# Patient Record
Sex: MALE | Race: WHITE | NOT HISPANIC OR LATINO | Employment: FULL TIME | URBAN - METROPOLITAN AREA
[De-identification: names, ages, dates, MRNs, and addresses within clinical notes are randomized per-mention and may not be internally consistent; named-entity substitution may affect disease eponyms.]

---

## 2020-02-18 DIAGNOSIS — J20.9 ACUTE BRONCHITIS, UNSPECIFIED ORGANISM: Primary | ICD-10-CM

## 2020-02-18 RX ORDER — METHYLPREDNISOLONE 4 MG/1
TABLET ORAL
Qty: 21 TABLET | Refills: 0 | Status: SHIPPED | OUTPATIENT
Start: 2020-02-18

## 2020-02-18 RX ORDER — PROMETHAZINE HYDROCHLORIDE AND CODEINE PHOSPHATE 6.25; 1 MG/5ML; MG/5ML
5 SYRUP ORAL EVERY 6 HOURS PRN
Qty: 240 ML | Refills: 0 | Status: SHIPPED | OUTPATIENT
Start: 2020-02-18

## 2022-08-17 RX ORDER — SULFAMETHOXAZOLE AND TRIMETHOPRIM 800; 160 MG/1; MG/1
1 TABLET ORAL 2 TIMES DAILY
Qty: 20 TABLET | Refills: 0 | Status: SHIPPED | OUTPATIENT
Start: 2022-08-17

## 2022-11-24 RX ORDER — OSELTAMIVIR PHOSPHATE 75 MG/1
75 CAPSULE ORAL DAILY
Qty: 10 CAPSULE | Refills: 0 | Status: SHIPPED | OUTPATIENT
Start: 2022-11-24

## 2023-07-25 ENCOUNTER — OFFICE VISIT (OUTPATIENT)
Dept: NEUROLOGY | Facility: CLINIC | Age: 43
End: 2023-07-25
Payer: COMMERCIAL

## 2023-07-25 VITALS — DIASTOLIC BLOOD PRESSURE: 84 MMHG | HEART RATE: 71 BPM | SYSTOLIC BLOOD PRESSURE: 138 MMHG

## 2023-07-25 DIAGNOSIS — G47.33 OBSTRUCTIVE SLEEP APNEA: Primary | ICD-10-CM

## 2023-07-25 PROCEDURE — 99204 OFFICE O/P NEW MOD 45 MIN: CPT | Performed by: PSYCHIATRY & NEUROLOGY

## 2023-07-25 RX ORDER — LOSARTAN POTASSIUM 25 MG/1
1 TABLET ORAL DAILY
COMMUNITY
Start: 2023-05-15

## 2023-07-25 RX ORDER — AMLODIPINE BESYLATE 10 MG/1
1 TABLET ORAL DAILY
COMMUNITY
Start: 2023-04-27

## 2023-07-25 RX ORDER — HYDROCHLOROTHIAZIDE 25 MG/1
TABLET ORAL
COMMUNITY
Start: 2015-10-19

## 2023-07-25 RX ORDER — METHOCARBAMOL 500 MG/1
TABLET, FILM COATED ORAL
COMMUNITY
Start: 2021-06-15

## 2023-07-25 NOTE — PROGRESS NOTES
Chief Complaint  Sleep Apnea    Subjective          Anton Ferris presents to [facility] NEUROLOGY  History of Present Illness  New patient     10 years ago was diagnosed with sleep apnea, used CPAP on regular basis.  Until several years ago    Pt not using. CPAP     Pt snores and has apnea during sleep   The patient complains of snoring in supine position only.    The patient complains of Leg symptoms:  none .   The patient complains of problems with insomnia:   none .    Sleep schedule: Bedtime:10 , gets out of bed at 8, sleep latency: 5 min -45 min, Gets about 5 hours of sleep.    EPWORTH SLEEPINESS SCALE  Sitting and reading 3 WatchingTV 3  Sitting, inactive, in a public place 1  As a passenger in a car for 1 hour w/o a break  2  Lying down to rest in the afternoon  3  Sitting and talking to someone  0  Sitting quietly after a lunch  1  In a car, while stopped for traffic or a light  0  Total 13          Review of Systems   Allergic/Immunologic: Positive for environmental allergies.   All other systems reviewed and are negative.  There is no history of hypnagogic hallucinations, sleep paralysis or cataplexy.   There is no h/O sleepwalking or bedwetting or nightmares or sleep eating or acting out dreams    Objective   Vital Signs:   /84   Pulse 71     Physical Exam  Vitals reviewed.   Constitutional:       Appearance: Normal appearance.   HENT:      Nose: Nose normal.   Eyes:      Conjunctiva/sclera: Conjunctivae normal.   Cardiovascular:      Rate and Rhythm: Normal rate.   Pulmonary:      Effort: Pulmonary effort is normal.   Neurological:      Mental Status: He is alert and oriented to person, place, and time.   Psychiatric:         Mood and Affect: Mood normal.      Result Review :                 Assessment and Plan    Diagnoses and all orders for this visit:    1. Obstructive sleep apnea (Primary)    Will obtain hst and resume treatment with CPAP        Follow Up   Return for Follow  Up visit 30 to 90 days after obtaining PAP.  Patient was given instructions and counseling regarding his condition or for health maintenance advice. Please see specific information pulled into the AVS if appropriate.       This document has been electronically signed by Joseph Seipel, MD on July 25, 2023 16:15 EDT

## 2023-07-28 ENCOUNTER — PATIENT ROUNDING (BHMG ONLY) (OUTPATIENT)
Dept: NEUROLOGY | Facility: CLINIC | Age: 43
End: 2023-07-28
Payer: COMMERCIAL

## 2023-08-02 DIAGNOSIS — G47.33 OBSTRUCTIVE SLEEP APNEA: Primary | ICD-10-CM

## 2023-08-04 ENCOUNTER — TELEPHONE (OUTPATIENT)
Dept: NEUROLOGY | Facility: CLINIC | Age: 43
End: 2023-08-04
Payer: COMMERCIAL

## 2023-08-04 NOTE — TELEPHONE ENCOUNTER
Caller: CAROLANN ARIAS    Relationship: Emergency Contact    Best call back number: [phone]    Who are you requesting to speak with (clinical staff, provider,  specific staff member): DR SEIPEL    What was the call regarding:   PT RECEIVED A LETTER FROM HIS INSURANCE COMPANY SAYING A SLEEP TEST IS NOT MEDICALLY NECESSARY DUE TO THE PT NOT HAVING ONE OF TWO TREATMENTS WHICH WOULD BE SURGERY OR A MOUTH PIECE.    THE LETTER STATES A COPY WAS SENT TO DR SEIPEL. AS WELL    PT CALL PT BACK TO DISCUSS HIS OPTIONS.

## 2023-08-08 NOTE — TELEPHONE ENCOUNTER
PT'S WIFE CALLED AND STATES THAT SHE SPOKE WITH [employer] AND THEY ARE STATING THAT INSURANCE WILL NOT PAY FOR MACHINE IF THEY DO NOT HAVE A SLEEP STUDY, WIFE STATES THAT [employer] DID STATE THAT THEY WILL TAKE HIS LAST SLEEP STUDY AND USE THAT.  WIFE SPOKE WIFE DAVID AT [facility].

## 2023-08-08 NOTE — TELEPHONE ENCOUNTER
Reached out to [employer] regarding this. They have copy of old study. Seeing if they can use that. Will update spouse/pt as soon as they let me know.    HUB ok to read

## 2023-08-16 RX ORDER — LOPERAMIDE HYDROCHLORIDE 2 MG/1
CAPSULE ORAL
Qty: 30 CAPSULE | Refills: 11 | Status: SHIPPED | OUTPATIENT
Start: 2023-08-16

## 2023-08-16 RX ORDER — ONDANSETRON 4 MG/1
4 TABLET, ORALLY DISINTEGRATING ORAL EVERY 8 HOURS PRN
Qty: 30 TABLET | Refills: 11 | Status: SHIPPED | OUTPATIENT
Start: 2023-08-16

## 2023-09-21 RX ORDER — PREDNISONE 20 MG/1
40 TABLET ORAL EVERY MORNING
Qty: 30 TABLET | Refills: 5 | Status: SHIPPED | OUTPATIENT
Start: 2023-09-21

## 2023-09-21 RX ORDER — FLUTICASONE PROPIONATE 50 MCG
2 SPRAY, SUSPENSION (ML) NASAL DAILY
Qty: 16 G | Refills: 11 | Status: SHIPPED | OUTPATIENT
Start: 2023-09-21

## 2023-11-08 NOTE — PROGRESS NOTES
Sleep medicine follow-up visit    Anton Ferris   1980  43 y.o. male   DATE OF SERVICE: 11/13/2023     Patient is currently using CPAP machine.  He does well with it,but for the first 5 minutes, he feels like he has a hard time breathing.  He uses  the nasal mask and gets his supplies from [place].  He complained of some shortness of air when initially putting the machine on is currently set at 6-16, I suggested increasing the pressure to 8-16 to see if it would help with that initial starting with the machine.  The patient was told to contact the clinic if this made him uncomfortable or it needed to be changed further.    SLEEP TESTING HISTORY:    10 years ago was diagnosed with sleep apnea, used CPAP on regular basis.  Until several years ago     The compliance data reviewed and the patient is on CPAP therapy at 6-16 cm/H2O and compliance data indicates excellent compliance with 83% usage for more than 4 hours with an average usage of 5 hours 43 minutes. AHI down to 0.6 with CPAP therapy and mean CPAP pressure 9.6 cm of water.  The patient's hypersomnia also resolved with [place] Sleepiness Scale score of 5 with CPAP therapy.  The patient feels great and is benefiting from it and is compliant.       EPWORTH SLEEPINESS SCALE  Sitting and reading 2 WatchingTV 2  Sitting, inactive, in a public place 0  As a passenger in a car for 1 hour w/o a break  0  Lying down to rest in the afternoon  1  Sitting and talking to someone  0  Sitting quietly after a lunch  0  In a car, while stopped for traffic or a light  0  Total 5      Review of Systems   All other systems reviewed and are negative.    I reviewed and addressed ROS entered by MA.        The following portions of the patient's history were reviewed and updated as appropriate: allergies, current medications, past family history, past medical history, past social history, past surgical history and problem list.      History reviewed. No pertinent family  history.    History reviewed. No pertinent past medical history.    Social History     Socioeconomic History    Marital status:    Tobacco Use    Smoking status: Never    Smokeless tobacco: Never   Substance and Sexual Activity    Alcohol use: Yes    Drug use: Never         Current Outpatient Medications:     amLODIPine (NORVASC) 10 MG tablet, Take 1 tablet by mouth Daily., Disp: , Rfl:     fluticasone (FLONASE) 50 MCG/ACT nasal spray, 2 sprays into the nostril(s) as directed by provider Daily. USE TWICE DAILY ON BAD DAYS., Disp: 16 g, Rfl: 11    losartan (COZAAR) 25 MG tablet, Take 1 tablet by mouth Daily., Disp: , Rfl:     ondansetron ODT (ZOFRAN-ODT) 4 MG disintegrating tablet, Place 1 tablet on the tongue Every 8 (Eight) Hours As Needed for Nausea or Vomiting., Disp: 30 tablet, Rfl: 11    No Known Allergies     PHYSICAL EXAMINATION:  Vitals:    11/13/23 1245   BP: 144/83   Pulse: 81      Body mass index is 35.36 kg/m².       HEENT: Normal.    CARDIAC: Normal.   LUNGS: Clear to auscultation.   EXTREMITIES: No edema.     Diagnoses and all orders for this visit:    1. Obstructive sleep apnea (Primary)    The patient is to call the clinic if he has any difficulty with that change in the lower pressure.  The patient was cautioned that obstructive sleep disordered breathing might be aggravated by certain conditions such as  post anesthetic states or respiratory allergies.  Medications such as sedatives, hypnotics, muscle relaxants, opiate analgesics and or alcohol can aggravate the underlying obstructive sleep disordered breathing.If the patient is significantly drowsy or inattentive during the daytime, should be advised to avoid situations such as driving in which safety could be compromised by impairment of alertness.   Mask Tubing and filters per DME    Return in about 6 months (around 5/13/2024) for 6 months , Next scheduled follow up.    I spent 17 minutes caring for Anton on this date of service. This  time includes time spent by me in the following activities: reviewing tests, obtaining and/or reviewing a separately obtained history, performing a medically appropriate examination and/or evaluation, counseling and educating the patient/family/caregiver, ordering medications, tests, or procedures, and documenting information in the medical record.      This document has been electronically signed by Yesi COMBS on November 13, 2023 13:07 EST

## 2023-11-13 ENCOUNTER — OFFICE VISIT (OUTPATIENT)
Dept: NEUROLOGY | Facility: CLINIC | Age: 43
End: 2023-11-13
Payer: COMMERCIAL

## 2023-11-13 VITALS
DIASTOLIC BLOOD PRESSURE: 83 MMHG | HEART RATE: 81 BPM | BODY MASS INDEX: 35.52 KG/M2 | SYSTOLIC BLOOD PRESSURE: 144 MMHG | HEIGHT: 73 IN | WEIGHT: 268 LBS

## 2023-11-13 DIAGNOSIS — G47.33 OBSTRUCTIVE SLEEP APNEA: Primary | ICD-10-CM

## 2023-11-13 PROCEDURE — 99212 OFFICE O/P EST SF 10 MIN: CPT | Performed by: NURSE PRACTITIONER
